# Patient Record
Sex: MALE | Race: WHITE | Employment: OTHER | URBAN - METROPOLITAN AREA
[De-identification: names, ages, dates, MRNs, and addresses within clinical notes are randomized per-mention and may not be internally consistent; named-entity substitution may affect disease eponyms.]

---

## 2024-06-26 ENCOUNTER — CLINICAL DOCUMENTATION (OUTPATIENT)
Facility: HOSPITAL | Age: 69
End: 2024-06-26

## 2024-06-26 ENCOUNTER — HOSPITAL ENCOUNTER (OUTPATIENT)
Facility: HOSPITAL | Age: 69
Discharge: HOME | End: 2024-06-29

## 2024-06-26 VITALS
BODY MASS INDEX: 25.9 KG/M2 | HEIGHT: 71 IN | DIASTOLIC BLOOD PRESSURE: 76 MMHG | HEART RATE: 70 BPM | RESPIRATION RATE: 16 BRPM | WEIGHT: 185 LBS | SYSTOLIC BLOOD PRESSURE: 155 MMHG

## 2024-06-26 DIAGNOSIS — C61 PROSTATE CANCER (HCC): Primary | ICD-10-CM

## 2024-06-26 RX ORDER — TADALAFIL 5 MG/1
TABLET ORAL
COMMUNITY
Start: 2024-01-19

## 2024-06-26 RX ORDER — LISINOPRIL 5 MG/1
5 TABLET ORAL DAILY
COMMUNITY

## 2024-06-26 ASSESSMENT — PAIN SCALES - GENERAL: PAINLEVEL_OUTOF10: 0

## 2024-06-26 NOTE — CONSULTS
RADIATION ONCOLOGY NEW PATIENT CONSULT NOTE    Patient Name: Claude Boland  Patient YOB: 1955   Medical Record Number: [id number]  Referring Physician: Ketan Hanley MD  [id number] [id number]  Primary Care Provider: Siegrist, Stephen K, DO    DIAGNOSIS & STAGING: Cancer Staging   No matching staging information was found for the patient.    ICD-10-CM    1. Prostate cancer (HCC)  C61         AJCC Staging has been reviewed      CHIEF COMPLAINT: Unfavorable intermediate risk prostate cancer, Tono 4 + 3, cT1c, PSA 5.30 ng/mL.    HISTORY OF PRESENT ILLNESS:      Mr Boland is a 68 year old man with HTN, ED on Cialis, R hip replacement, and a new diagnosis of prostate cancer.   He was seen by urology for ED and noted to have elevated PSA, with PSA most recently measured prior to consult as 5.30 ng/mL on 1/19/2024.   MRI 3/25/2024 demonstrated a 33 cc prostate with 2X PI-RADS 4 lesions, without extraprostatic extension.  I reviewed this MRI personally and agree with the radiology report.  He was diagnosed with prostate cancer by needle biopsy. His biopsy was 4/17/24 with Dr Richmond. Pathology revealed cancer in 3/12 template cores + 3/3 MRI targets, with Milford score up to 4 + 3 in the MRI target. He had a cancer talk with Dr Hanley and I reviewed the note.   An Oncotype report dated 5/20/2024 showed a score of 48, with worse than median for unfavorable intermediate risk.  He was accompanied to his consult by his wife. His urinary symptoms are mild with IPSS = 5, QOL = 2/mostly satisfied. GWEN = 15. His last colon cancer screening was 2023 and he is not due.         PHYSICAL EXAM:     Vital Signs for this encounter:  BSA: 2.05 meters squared  BP (!) 155/76   Pulse 70   Resp 16   Ht 1.803 m (5' 11\")   Wt 83.9 kg (185 lb)   BMI 25.80 kg/m²   Performance status: ECOG 0, fully active, able to carry on all predisease activities without restrictions  Constitutional: No evidence of

## 2024-06-28 NOTE — PROGRESS NOTES
NCCN Distress Thermometer    Date Screening Completed: 6/26/24    Screening Declined:  [] Yes    Number that best describes how much distress you've experienced in the past week, including today?  0 [] - No distress 1 []      2 []      3 []      4 [x]       5 [x]       6 []      7 []      8 []      9 []       10 [] - Extreme distress    PROBLEM LIST  Have you had concerns about any of the items below in the past week, including today?      Physical Concerns Practical Concerns   [x] Pain [] Taking care of myself    [] Sleep [] Taking care of others    [] Fatigue [] Work   [] Tobacco use  [] School   [] Substance use  [] Housing   [] Memory or concentration [] Finances   [x] Sexual health [] Insurance   [] Changes in eating  [] Transportation   [] Loss or change of physical abilities  []     [] Having enough food   Emotional Concerns [] Access to medicine   [] Worry or anxiety [] Treatment decisions   [] Sadness or depression    [] Loss of interest or enjoyment  Spiritual or Yarsanism Concerns   [] Grief or loss  [] Sense of meaning or purpose   [] Fear [] Changes in shahnaz or beliefs   [] Loneliness  [] Death, dying, or afterlife   [] Anger [] Conflict between beliefs and cancer treatments    [] Changes in appearance [] Relationship with the sacred   [] Feelings of worthlessness or being a burden [] Ritual or dietary needs        Social Concerns     [] Relationship with spouse or partner     [] Relationship with children    [] Relationship with family members     [] Relationship with friends or coworkers     [] Communication with health care team     [] Ability to have children     [] Prejudice or discrimination        Other Concerns:     Patient received resource information and education:  [] Yes  [x] No